# Patient Record
Sex: MALE | URBAN - NONMETROPOLITAN AREA
[De-identification: names, ages, dates, MRNs, and addresses within clinical notes are randomized per-mention and may not be internally consistent; named-entity substitution may affect disease eponyms.]

---

## 2021-11-16 ENCOUNTER — OFFICE VISIT (OUTPATIENT)
Dept: FAMILY MEDICINE CLINIC | Age: 41
End: 2021-11-16
Payer: COMMERCIAL

## 2021-11-16 VITALS
WEIGHT: 201.4 LBS | DIASTOLIC BLOOD PRESSURE: 84 MMHG | HEIGHT: 68 IN | HEART RATE: 75 BPM | BODY MASS INDEX: 30.52 KG/M2 | OXYGEN SATURATION: 97 % | TEMPERATURE: 97.3 F | SYSTOLIC BLOOD PRESSURE: 120 MMHG

## 2021-11-16 DIAGNOSIS — Z11.4 SCREENING FOR HIV (HUMAN IMMUNODEFICIENCY VIRUS): ICD-10-CM

## 2021-11-16 DIAGNOSIS — Z13.220 SCREENING, LIPID: ICD-10-CM

## 2021-11-16 DIAGNOSIS — F90.9 ATTENTION DEFICIT HYPERACTIVITY DISORDER (ADHD), UNSPECIFIED ADHD TYPE: ICD-10-CM

## 2021-11-16 DIAGNOSIS — F41.9 ANXIETY: ICD-10-CM

## 2021-11-16 DIAGNOSIS — Z11.59 ENCOUNTER FOR HEPATITIS C SCREENING TEST FOR LOW RISK PATIENT: ICD-10-CM

## 2021-11-16 DIAGNOSIS — R53.83 FATIGUE, UNSPECIFIED TYPE: Primary | ICD-10-CM

## 2021-11-16 DIAGNOSIS — R53.83 FATIGUE, UNSPECIFIED TYPE: ICD-10-CM

## 2021-11-16 DIAGNOSIS — F32.A DEPRESSION, UNSPECIFIED DEPRESSION TYPE: ICD-10-CM

## 2021-11-16 PROBLEM — E55.9 VITAMIN D DEFICIENCY: Status: ACTIVE | Noted: 2021-11-16

## 2021-11-16 LAB
ALBUMIN SERPL-MCNC: 4.7 G/DL (ref 3.5–4.6)
ALP BLD-CCNC: 63 U/L (ref 35–104)
ALT SERPL-CCNC: 29 U/L (ref 0–41)
ANION GAP SERPL CALCULATED.3IONS-SCNC: 11 MEQ/L (ref 9–15)
AST SERPL-CCNC: 24 U/L (ref 0–40)
BILIRUB SERPL-MCNC: 0.4 MG/DL (ref 0.2–0.7)
BUN BLDV-MCNC: 19 MG/DL (ref 6–20)
CALCIUM SERPL-MCNC: 9.1 MG/DL (ref 8.5–9.9)
CHLORIDE BLD-SCNC: 104 MEQ/L (ref 95–107)
CHOLESTEROL, TOTAL: 177 MG/DL (ref 0–199)
CO2: 26 MEQ/L (ref 20–31)
CREAT SERPL-MCNC: 1.04 MG/DL (ref 0.7–1.2)
GFR AFRICAN AMERICAN: >60
GFR NON-AFRICAN AMERICAN: >60
GLOBULIN: 2.7 G/DL (ref 2.3–3.5)
GLUCOSE BLD-MCNC: 86 MG/DL (ref 70–99)
HCT VFR BLD CALC: 43 % (ref 42–52)
HDLC SERPL-MCNC: 45 MG/DL (ref 40–59)
HEMOGLOBIN: 14.4 G/DL (ref 14–18)
HEPATITIS C ANTIBODY INTERPRETATION: NORMAL
LDL CHOLESTEROL CALCULATED: 111 MG/DL (ref 0–129)
MCH RBC QN AUTO: 29.6 PG (ref 27–31.3)
MCHC RBC AUTO-ENTMCNC: 33.5 % (ref 33–37)
MCV RBC AUTO: 88.1 FL (ref 80–100)
PDW BLD-RTO: 12.9 % (ref 11.5–14.5)
PLATELET # BLD: 318 K/UL (ref 130–400)
POTASSIUM SERPL-SCNC: 4.7 MEQ/L (ref 3.4–4.9)
RBC # BLD: 4.88 M/UL (ref 4.7–6.1)
SODIUM BLD-SCNC: 141 MEQ/L (ref 135–144)
TOTAL PROTEIN: 7.4 G/DL (ref 6.3–8)
TRIGL SERPL-MCNC: 103 MG/DL (ref 0–150)
TSH SERPL DL<=0.05 MIU/L-ACNC: 0.29 UIU/ML (ref 0.44–3.86)
VITAMIN D 25-HYDROXY: 27.2 NG/ML (ref 30–100)
WBC # BLD: 6.8 K/UL (ref 4.8–10.8)

## 2021-11-16 PROCEDURE — 99202 OFFICE O/P NEW SF 15 MIN: CPT | Performed by: FAMILY MEDICINE

## 2021-11-16 RX ORDER — ESCITALOPRAM OXALATE 20 MG/1
TABLET ORAL
COMMUNITY
Start: 2021-11-05

## 2021-11-16 RX ORDER — DEXTROAMPHETAMINE SACCHARATE, AMPHETAMINE ASPARTATE, DEXTROAMPHETAMINE SULFATE AND AMPHETAMINE SULFATE 2.5; 2.5; 2.5; 2.5 MG/1; MG/1; MG/1; MG/1
10 TABLET ORAL 3 TIMES DAILY
COMMUNITY
Start: 2021-11-16

## 2021-11-16 RX ORDER — BUPROPION HYDROCHLORIDE 300 MG/1
TABLET ORAL
COMMUNITY
Start: 2021-11-05

## 2021-11-16 SDOH — ECONOMIC STABILITY: TRANSPORTATION INSECURITY
IN THE PAST 12 MONTHS, HAS LACK OF TRANSPORTATION KEPT YOU FROM MEETINGS, WORK, OR FROM GETTING THINGS NEEDED FOR DAILY LIVING?: NO

## 2021-11-16 SDOH — ECONOMIC STABILITY: FOOD INSECURITY: WITHIN THE PAST 12 MONTHS, YOU WORRIED THAT YOUR FOOD WOULD RUN OUT BEFORE YOU GOT MONEY TO BUY MORE.: NEVER TRUE

## 2021-11-16 SDOH — ECONOMIC STABILITY: FOOD INSECURITY: WITHIN THE PAST 12 MONTHS, THE FOOD YOU BOUGHT JUST DIDN'T LAST AND YOU DIDN'T HAVE MONEY TO GET MORE.: NEVER TRUE

## 2021-11-16 SDOH — ECONOMIC STABILITY: TRANSPORTATION INSECURITY
IN THE PAST 12 MONTHS, HAS THE LACK OF TRANSPORTATION KEPT YOU FROM MEDICAL APPOINTMENTS OR FROM GETTING MEDICATIONS?: NO

## 2021-11-16 ASSESSMENT — SOCIAL DETERMINANTS OF HEALTH (SDOH): HOW HARD IS IT FOR YOU TO PAY FOR THE VERY BASICS LIKE FOOD, HOUSING, MEDICAL CARE, AND HEATING?: NOT HARD AT ALL

## 2021-11-16 NOTE — PROGRESS NOTES
Chief Complaint   Patient presents with   [facility].     would like blood work, testosterone, having fatigue        HPI:  Ayleen Haque is a 39 y.o. male       New patient  O[facility]RS shows adderall from provider out of [facility] area  Consistent fills since 2019 from same group  On psych meds for over 15 years he states     Fatigue over the past year or more  Wanted to get a laboratory workup    Patient Active Problem List   Diagnosis    Anxiety    Depression    ADHD    Vitamin D deficiency       Current Outpatient Medications   Medication Sig Dispense Refill    buPROPion (WELLBUTRIN XL) 300 MG extended release tablet TAKE ONE TABLET BY MOUTH DAILY      escitalopram (LEXAPRO) 20 MG tablet TAKE ONE TABLET BY MOUTH DAILY      amphetamine-dextroamphetamine (ADDERALL, 10MG,) 10 MG tablet Take 1 tablet by mouth 3 times daily. No current facility-administered medications for this visit. Past Medical History:   Diagnosis Date    ADHD     Anxiety     Depression     Vitamin D deficiency      History reviewed. No pertinent surgical history. Family History   Problem Relation Age of Onset    Cancer Father         pancreatic    Diabetes Maternal Grandfather      Social History     Socioeconomic History    Marital status: None     Spouse name: None    Number of children: None    Years of education: None    Highest education level: None   Occupational History    None   Tobacco Use    Smoking status: Former Smoker     Packs/day: 1.00     Years: 10.00     Pack years: 10.00     Types: Cigarettes    Smokeless tobacco: Never Used   Substance and Sexual Activity    Alcohol use:  Yes    Drug use: Never    Sexual activity: None   Other Topics Concern    None   Social History Narrative    None     Social Determinants of Health     Financial Resource Strain: Low Risk     Difficulty of Paying Living Expenses: Not hard at all   Food Insecurity: No Food Insecurity    Worried About Running Out of Food in the Last Year: Never true    [address] in the Last Year: Never true   Transportation Needs: No Transportation Needs    Lack of Transportation (Medical): No    Lack of Transportation (Non-Medical): No   Physical Activity:     Days of Exercise per Week: Not on file    Minutes of Exercise per Session: Not on file   Stress:     Feeling of Stress : Not on file   Social Connections:     Frequency of Communication with Friends and Family: Not on file    Frequency of Social Gatherings with Friends and Family: Not on file    Attends Tenriism Services: Not on file    Active Member of [employer] Group or Organizations: Not on file    Attends Club or Organization Meetings: Not on file    Marital Status: Not on file   Intimate Partner Violence:     Fear of Current or Ex-Partner: Not on file    Emotionally Abused: Not on file    Physically Abused: Not on file    Sexually Abused: Not on file   Housing Stability:     Unable to Pay for Housing in the Last Year: Not on file    Number of [place] in the Last Year: Not on file    Unstable Housing in the Last Year: Not on file     No Known Allergies    Review of Systems:   General ROS: negative for - chills, fatigue, fever, malaise, weight gain or weight loss  Respiratory ROS: no cough, shortness of breath, or wheezing  Cardiovascular ROS: no chest pain or dyspnea on exertion  Gastrointestinal ROS: no abdominal pain, change in bowel habits, or black or bloody stools  Genito-Urinary ROS: no dysuria, trouble voiding  Musculoskeletal ROS: negative for - gait disturbance, joint pain or joint stiffness  Neurological ROS: negative for - behavioral changes, memory loss, numbness/tingling, tremors or weakness    In general patient otherwise reports feeling well.      Physical Exam:  /84 (Site: Left Upper Arm)   Pulse 75   Temp 97.3 °F (36.3 °C)   Ht 5' 8\" (1.727 m)   Wt 201 lb 6.4 oz (91.4 kg)   SpO2 97%   BMI 30.62 kg/m²     Gen: Well, NAD, Alert, Oriented x 3   HEENT: EOMI, eyes clear, MMM  Skin: without rash or jaundice  Neck: no significant lymphadenopathy or thyromegaly  Lungs: CTA B w/out Rales/Wheezes/Rhonchi, Good respiratory effort   Heart: RRR, S1S2, w/out M/R/G, non-displaced PMI     Ext: No C/C/E Bilaterally. Neuro: Neurovascularly intact w/ Sensory/Motor intact UE/LE Bilaterally. No results found for: WBC, HGB, HCT, PLT, CHOL, TRIG, HDL, LDLDIRECT, ALT, AST, NA, K, CL, CREATININE, BUN, CO2, TSH, PSA, INR, GLUF, LABA1C, LABMICR      A&P   Diagnosis Orders   1. Fatigue, unspecified type  Testosterone Free And Total Male    CBC    Comprehensive Metabolic Panel    TSH without Reflex    Vitamin D 25 Hydroxy   2. Anxiety     3. Depression, unspecified depression type     4. Attention deficit hyperactivity disorder (ADHD), unspecified ADHD type  amphetamine-dextroamphetamine (ADDERALL, 10MG,) 10 MG tablet   5. Screening, lipid  Lipid Panel   6. Encounter for hepatitis C screening test for low risk patient  Hepatitis C Antibody   7.  Screening for HIV (human immunodeficiency virus)  HIV Screen     Labs as requested  Level 2 new patient (self pay)      Esther Little MD

## 2021-11-18 LAB — HIV AG/AB: NONREACTIVE

## 2021-11-19 LAB
SEX HORMONE BINDING GLOBULIN: 30 NMOL/L (ref 11–80)
TESTOSTERONE FREE PERCENT: 2 % (ref 1.6–2.9)
TESTOSTERONE FREE, CALC: 102 PG/ML (ref 47–244)
TESTOSTERONE TOTAL-MALE: 511 NG/DL (ref 300–890)

## 2025-01-27 SDOH — HEALTH STABILITY: PHYSICAL HEALTH: ON AVERAGE, HOW MANY DAYS PER WEEK DO YOU ENGAGE IN MODERATE TO STRENUOUS EXERCISE (LIKE A BRISK WALK)?: 0 DAYS

## 2025-01-28 ENCOUNTER — OFFICE VISIT (OUTPATIENT)
Dept: FAMILY MEDICINE CLINIC | Age: 45
End: 2025-01-28
Payer: COMMERCIAL

## 2025-01-28 VITALS — BODY MASS INDEX: 31.55 KG/M2 | WEIGHT: 201 LBS | HEIGHT: 67 IN

## 2025-01-28 DIAGNOSIS — N48.1 BALANITIS: Primary | ICD-10-CM

## 2025-01-28 DIAGNOSIS — R79.89 ABNORMAL TSH: ICD-10-CM

## 2025-01-28 DIAGNOSIS — F90.9 ATTENTION DEFICIT HYPERACTIVITY DISORDER (ADHD), UNSPECIFIED ADHD TYPE: ICD-10-CM

## 2025-01-28 DIAGNOSIS — L73.9 FOLLICULITIS: ICD-10-CM

## 2025-01-28 DIAGNOSIS — K59.09 OTHER CONSTIPATION: ICD-10-CM

## 2025-01-28 DIAGNOSIS — E55.9 VITAMIN D DEFICIENCY: ICD-10-CM

## 2025-01-28 DIAGNOSIS — F32.A DEPRESSION, UNSPECIFIED DEPRESSION TYPE: ICD-10-CM

## 2025-01-28 PROCEDURE — 99205 OFFICE O/P NEW HI 60 MIN: CPT | Performed by: FAMILY MEDICINE

## 2025-01-28 RX ORDER — DEXTROAMPHETAMINE SACCHARATE, AMPHETAMINE ASPARTATE, DEXTROAMPHETAMINE SULFATE AND AMPHETAMINE SULFATE 2.5; 2.5; 2.5; 2.5 MG/1; MG/1; MG/1; MG/1
10 TABLET ORAL DAILY
Qty: 90 TABLET | Refills: 0 | Status: SHIPPED | OUTPATIENT
Start: 2025-01-28 | End: 2025-04-28

## 2025-01-28 RX ORDER — ESCITALOPRAM OXALATE 20 MG/1
20 TABLET ORAL DAILY
Qty: 30 TABLET | Status: CANCELLED | OUTPATIENT
Start: 2025-01-28

## 2025-01-28 RX ORDER — CEPHALEXIN 500 MG/1
500 CAPSULE ORAL 3 TIMES DAILY
Qty: 21 CAPSULE | Refills: 0 | Status: SHIPPED | OUTPATIENT
Start: 2025-01-28 | End: 2025-02-04

## 2025-01-28 RX ORDER — ESCITALOPRAM OXALATE 10 MG/1
10 TABLET ORAL DAILY
Qty: 30 TABLET | Refills: 3 | Status: SHIPPED | OUTPATIENT
Start: 2025-01-28

## 2025-01-28 RX ORDER — NYSTATIN 100000 U/G
CREAM TOPICAL
Qty: 15 G | Refills: 1 | Status: SHIPPED | OUTPATIENT
Start: 2025-01-28

## 2025-01-28 SDOH — ECONOMIC STABILITY: FOOD INSECURITY: WITHIN THE PAST 12 MONTHS, YOU WORRIED THAT YOUR FOOD WOULD RUN OUT BEFORE YOU GOT MONEY TO BUY MORE.: NEVER TRUE

## 2025-01-28 SDOH — ECONOMIC STABILITY: FOOD INSECURITY: WITHIN THE PAST 12 MONTHS, THE FOOD YOU BOUGHT JUST DIDN'T LAST AND YOU DIDN'T HAVE MONEY TO GET MORE.: NEVER TRUE

## 2025-01-28 ASSESSMENT — PATIENT HEALTH QUESTIONNAIRE - PHQ9
6. FEELING BAD ABOUT YOURSELF - OR THAT YOU ARE A FAILURE OR HAVE LET YOURSELF OR YOUR FAMILY DOWN: NOT AT ALL
1. LITTLE INTEREST OR PLEASURE IN DOING THINGS: NOT AT ALL
10. IF YOU CHECKED OFF ANY PROBLEMS, HOW DIFFICULT HAVE THESE PROBLEMS MADE IT FOR YOU TO DO YOUR WORK, TAKE CARE OF THINGS AT HOME, OR GET ALONG WITH OTHER PEOPLE: NOT DIFFICULT AT ALL
4. FEELING TIRED OR HAVING LITTLE ENERGY: NOT AT ALL
5. POOR APPETITE OR OVEREATING: NOT AT ALL
9. THOUGHTS THAT YOU WOULD BE BETTER OFF DEAD, OR OF HURTING YOURSELF: NOT AT ALL
7. TROUBLE CONCENTRATING ON THINGS, SUCH AS READING THE NEWSPAPER OR WATCHING TELEVISION: NOT AT ALL
2. FEELING DOWN, DEPRESSED OR HOPELESS: NOT AT ALL
SUM OF ALL RESPONSES TO PHQ QUESTIONS 1-9: 0
SUM OF ALL RESPONSES TO PHQ9 QUESTIONS 1 & 2: 0
SUM OF ALL RESPONSES TO PHQ QUESTIONS 1-9: 0
8. MOVING OR SPEAKING SO SLOWLY THAT OTHER PEOPLE COULD HAVE NOTICED. OR THE OPPOSITE, BEING SO FIGETY OR RESTLESS THAT YOU HAVE BEEN MOVING AROUND A LOT MORE THAN USUAL: NOT AT ALL
3. TROUBLE FALLING OR STAYING ASLEEP: NOT AT ALL

## 2025-01-28 NOTE — PATIENT INSTRUCTIONS
Keflex will be utilized 3 times a day for the folliculitis of the scalp.    If balanitis acts up again patient will utilize nystatin cream twice a day for 7 days until resolved remembering to keep the area clean and dry.  Moisture is a key component to causing problems with this condition.    Attention deficit will be treated with the lower dose of amphetamine as patient was noting irritability at the higher doses.  Medication refilled for 3 months.    Due to side effect of constipation Lexapro was cut back to 10 mg and will continue this along with Wellbutrin  mg.

## 2025-01-28 NOTE — PROGRESS NOTES
Diagnosis Orders   1. Balanitis  nystatin (MYCOSTATIN) 410991 UNIT/GM cream      2. Attention deficit hyperactivity disorder (ADHD), unspecified ADHD type  amphetamine-dextroamphetamine (ADDERALL, 10MG,) 10 MG tablet      3. Depression, unspecified depression type  escitalopram (LEXAPRO) 10 MG tablet      4. Folliculitis  cephALEXin (KEFLEX) 500 MG capsule      5. Other constipation        6. Vitamin D deficiency  Vitamin D 25 Hydroxy      7. Abnormal TSH  TSH reflex to FT4        Return in about 3 months (around 4/28/2025) for for review of outcome of today's recommendation.  Patient Instructions   Keflex will be utilized 3 times a day for the folliculitis of the scalp.    If balanitis acts up again patient will utilize nystatin cream twice a day for 7 days until resolved remembering to keep the area clean and dry.  Moisture is a key component to causing problems with this condition.    Attention deficit will be treated with the lower dose of amphetamine as patient was noting irritability at the higher doses.  Medication refilled for 3 months.    Due to side effect of constipation Lexapro was cut back to 10 mg and will continue this along with Wellbutrin  mg.    Subjective:      Patient ID: Bam Wells is a 45 y.o. male who presents for:  Chief Complaint   Patient presents with    Medication Refill    Rash     On genital comes and goes        HPI  Redness in his penis that comes and goes.  Odor on occasion.  Itchy on occasion.  No present illness at this time.  Has looked it up in the past and thought he had balanitis.    Wellbutrin and Adderall working well in combination with Lexapro for his symptoms.  Needs refills.  No desire for change.  Patient did note though when he went from 10 to 20 mg of Lexapro constipation worsened and he has been cutting the pill since then and thinks he is taking just 10 mg a day now.    Bumps on his scalp that are irritated.  Do not have pain.  Do not itch.  Does not get

## 2025-01-29 NOTE — PROGRESS NOTES
LM notifying pt that TSH and vitamin is ordered and pt does not need to fast. Will leave MC message as well.

## 2025-05-27 DIAGNOSIS — F90.9 ATTENTION DEFICIT HYPERACTIVITY DISORDER (ADHD), UNSPECIFIED ADHD TYPE: ICD-10-CM

## 2025-05-27 RX ORDER — DEXTROAMPHETAMINE SACCHARATE, AMPHETAMINE ASPARTATE, DEXTROAMPHETAMINE SULFATE AND AMPHETAMINE SULFATE 2.5; 2.5; 2.5; 2.5 MG/1; MG/1; MG/1; MG/1
10 TABLET ORAL DAILY
Qty: 90 TABLET | Refills: 0 | OUTPATIENT
Start: 2025-05-27 | End: 2025-08-25

## 2025-05-27 NOTE — TELEPHONE ENCOUNTER
Comments:     Last Office Visit (last PCP visit):   1/28/2025    Next Visit Date:  Future Appointments   Date Time Provider Department Center   6/12/2025  9:30 AM Terrance Garner MD [facility]       **If hasn't been seen in over a year OR hasn't followed up according to last diabetes/ADHD visit, make appointment for patient before sending refill to provider.    Rx requested:  Requested Prescriptions     Pending Prescriptions Disp Refills    amphetamine-dextroamphetamine (ADDERALL, 10MG,) 10 MG tablet 90 tablet 0     Sig: Take 1 tablet by mouth daily for 90 days. Max Daily Amount: 10 mg

## 2025-05-27 NOTE — TELEPHONE ENCOUNTER
Comments:      Last Office Visit (last PCP visit):   1/28/2025     Next Visit Date:    Future Appointments   Date Time Provider Department Center   6/12/2025  9:30 AM Terrance Garner MD [facility]        **If hasn't been seen in over a year OR hasn't followed up according to last diabetes/ADHD visit, make appointment for patient before sending refill to provider.     Rx requested:    Requested Prescriptions     Pending Prescriptions Disp Refills    amphetamine-dextroamphetamine (ADDERALL, 10MG,) 10 MG tablet 90 tablet 0     Sig: Take 1 tablet by mouth daily for 90 days. Max Daily Amount: 10 mg

## 2025-05-29 DIAGNOSIS — F90.9 ATTENTION DEFICIT HYPERACTIVITY DISORDER (ADHD), UNSPECIFIED ADHD TYPE: ICD-10-CM

## 2025-05-29 NOTE — TELEPHONE ENCOUNTER
Patient has an appt next month, patient is wondering why is was declined ?     Comments: Has Apt 06/12/25    Last Office Visit (last PCP visit):   1/28/2025    Next Visit Date:  Future Appointments   Date Time Provider Department Center   6/12/2025  9:30 AM Terrance Garner MD [facility]       **If hasn't been seen in over a year OR hasn't followed up according to last diabetes/ADHD visit, make appointment for patient before sending refill to provider.    Rx requested:  Requested Prescriptions     Pending Prescriptions Disp Refills    amphetamine-dextroamphetamine (ADDERALL, 10MG,) 10 MG tablet 90 tablet 0     Sig: Take 1 tablet by mouth daily for 90 days. Max Daily Amount: 10 mg

## 2025-06-02 DIAGNOSIS — F90.9 ATTENTION DEFICIT HYPERACTIVITY DISORDER (ADHD), UNSPECIFIED ADHD TYPE: ICD-10-CM

## 2025-06-02 RX ORDER — DEXTROAMPHETAMINE SACCHARATE, AMPHETAMINE ASPARTATE, DEXTROAMPHETAMINE SULFATE AND AMPHETAMINE SULFATE 2.5; 2.5; 2.5; 2.5 MG/1; MG/1; MG/1; MG/1
10 TABLET ORAL DAILY
Qty: 30 TABLET | Refills: 0 | Status: SHIPPED | OUTPATIENT
Start: 2025-06-02 | End: 2025-06-03 | Stop reason: SDUPTHER

## 2025-06-02 RX ORDER — DEXTROAMPHETAMINE SACCHARATE, AMPHETAMINE ASPARTATE, DEXTROAMPHETAMINE SULFATE AND AMPHETAMINE SULFATE 2.5; 2.5; 2.5; 2.5 MG/1; MG/1; MG/1; MG/1
10 TABLET ORAL DAILY
Qty: 30 TABLET | Refills: 0 | OUTPATIENT
Start: 2025-06-02 | End: 2025-08-31

## 2025-06-02 NOTE — TELEPHONE ENCOUNTER
Per  patient was suppose to see her in April and patient did cancel appt he had scheduled with her, she did say she will send in 30 days for patient and he has to keep his apt this month and to make sure he follows up every 3 months due to this being a controlled substance. I did leave a vm letting patient know of this info also.

## 2025-06-03 ENCOUNTER — OFFICE VISIT (OUTPATIENT)
Dept: FAMILY MEDICINE CLINIC | Age: 45
End: 2025-06-03
Payer: COMMERCIAL

## 2025-06-03 VITALS
SYSTOLIC BLOOD PRESSURE: 120 MMHG | WEIGHT: 210 LBS | DIASTOLIC BLOOD PRESSURE: 80 MMHG | BODY MASS INDEX: 31.83 KG/M2 | HEIGHT: 68 IN | OXYGEN SATURATION: 96 % | HEART RATE: 80 BPM

## 2025-06-03 DIAGNOSIS — F90.9 ATTENTION DEFICIT HYPERACTIVITY DISORDER (ADHD), UNSPECIFIED ADHD TYPE: ICD-10-CM

## 2025-06-03 DIAGNOSIS — F32.A DEPRESSION, UNSPECIFIED DEPRESSION TYPE: ICD-10-CM

## 2025-06-03 PROCEDURE — 99213 OFFICE O/P EST LOW 20 MIN: CPT | Performed by: FAMILY MEDICINE

## 2025-06-03 RX ORDER — BUPROPION HYDROCHLORIDE 300 MG/1
300 TABLET ORAL DAILY
Qty: 90 TABLET | Refills: 3 | Status: SHIPPED | OUTPATIENT
Start: 2025-06-03

## 2025-06-03 RX ORDER — ESCITALOPRAM OXALATE 10 MG/1
10 TABLET ORAL DAILY
Qty: 90 TABLET | Refills: 3 | Status: SHIPPED | OUTPATIENT
Start: 2025-06-03

## 2025-06-03 RX ORDER — DEXTROAMPHETAMINE SACCHARATE, AMPHETAMINE ASPARTATE, DEXTROAMPHETAMINE SULFATE AND AMPHETAMINE SULFATE 2.5; 2.5; 2.5; 2.5 MG/1; MG/1; MG/1; MG/1
10 TABLET ORAL DAILY
Qty: 90 TABLET | Refills: 0 | Status: SHIPPED | OUTPATIENT
Start: 2025-06-03 | End: 2025-09-01

## 2025-06-03 NOTE — PATIENT INSTRUCTIONS
Patient is doing well on current medications no changes.    Next appointment 3 months physical exam

## 2025-06-03 NOTE — PROGRESS NOTES
Diagnosis Orders   1. Attention deficit hyperactivity disorder (ADHD), unspecified ADHD type  amphetamine-dextroamphetamine (ADDERALL, 10MG,) 10 MG tablet    buPROPion (WELLBUTRIN XL) 300 MG extended release tablet      2. Depression, unspecified depression type  buPROPion (WELLBUTRIN XL) 300 MG extended release tablet    escitalopram (LEXAPRO) 10 MG tablet          See patient instructions for further assessment/plan specifics    Return in about 3 months (around 9/3/2025) for for physical exam and eval of preventative need.  Patient Instructions   Patient is doing well on current medications no changes.    Next appointment 3 months physical exam    Subjective:      Patient ID: Bam Wells is a 45 y.o. male who presents for:  Chief Complaint   Patient presents with    3 Month Follow-Up    ADHD     Adderall is working pt states       HPI  Patient notes baseline depression decently controlled with Wellbutrin plus Lexapro.  Does not take his Adderall all the time because he does note it can make him feel little bit agitated but when he takes the Adderall he notes improved focus significantly which is important for his job.  This works very well for him.        Current Outpatient Medications on File Prior to Visit   Medication Sig Dispense Refill    nystatin (MYCOSTATIN) 748936 UNIT/GM cream Apply topically 2 times daily. 15 g 1     No current facility-administered medications on file prior to visit.     Past Medical History:   Diagnosis Date    ADHD     Anxiety     Depression     Vitamin D deficiency      History reviewed. No pertinent surgical history.  Social History     Socioeconomic History    Marital status:      Spouse name: Not on file    Number of children: Not on file    Years of education: Not on file    Highest education level: Not on file   Occupational History    Not on file   Tobacco Use    Smoking status: Former     Current packs/day: 1.00     Average packs/day: 1 pack/day for 10.0 years (10.0

## 2025-08-11 DIAGNOSIS — F90.9 ATTENTION DEFICIT HYPERACTIVITY DISORDER (ADHD), UNSPECIFIED ADHD TYPE: ICD-10-CM

## 2025-08-11 RX ORDER — DEXTROAMPHETAMINE SACCHARATE, AMPHETAMINE ASPARTATE, DEXTROAMPHETAMINE SULFATE AND AMPHETAMINE SULFATE 2.5; 2.5; 2.5; 2.5 MG/1; MG/1; MG/1; MG/1
10 TABLET ORAL DAILY
Qty: 90 TABLET | Refills: 0 | Status: SHIPPED | OUTPATIENT
Start: 2025-08-11 | End: 2025-08-12 | Stop reason: SDUPTHER

## 2025-08-12 DIAGNOSIS — F90.9 ATTENTION DEFICIT HYPERACTIVITY DISORDER (ADHD), UNSPECIFIED ADHD TYPE: ICD-10-CM

## 2025-08-13 RX ORDER — DEXTROAMPHETAMINE SACCHARATE, AMPHETAMINE ASPARTATE, DEXTROAMPHETAMINE SULFATE AND AMPHETAMINE SULFATE 2.5; 2.5; 2.5; 2.5 MG/1; MG/1; MG/1; MG/1
10 TABLET ORAL DAILY
Qty: 30 TABLET | Refills: 0 | Status: SHIPPED | OUTPATIENT
Start: 2025-08-13 | End: 2025-09-12

## 2025-08-15 ENCOUNTER — TELEPHONE (OUTPATIENT)
Dept: FAMILY MEDICINE CLINIC | Age: 45
End: 2025-08-15

## 2025-08-18 DIAGNOSIS — F90.9 ATTENTION DEFICIT HYPERACTIVITY DISORDER (ADHD), UNSPECIFIED ADHD TYPE: ICD-10-CM

## 2025-08-18 RX ORDER — DEXTROAMPHETAMINE SACCHARATE, AMPHETAMINE ASPARTATE, DEXTROAMPHETAMINE SULFATE AND AMPHETAMINE SULFATE 2.5; 2.5; 2.5; 2.5 MG/1; MG/1; MG/1; MG/1
10 TABLET ORAL 2 TIMES DAILY
Qty: 60 TABLET | Refills: 0 | Status: SHIPPED | OUTPATIENT
Start: 2025-08-18 | End: 2025-09-17